# Patient Record
Sex: FEMALE | Race: WHITE | NOT HISPANIC OR LATINO | URBAN - METROPOLITAN AREA
[De-identification: names, ages, dates, MRNs, and addresses within clinical notes are randomized per-mention and may not be internally consistent; named-entity substitution may affect disease eponyms.]

---
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
## 2021-06-13 NOTE — ED ADULT NURSE NOTE - NSSUSCREENINGQ2_ED_ALL_ED
No [FreeTextEntry1] : Blood tests and urine sent to the lab \par \par \par \par Repeat breast US as per scheduled appointment in July

## 2023-08-31 NOTE — ED ADULT NURSE NOTE - SKIN CAPILLARY REFILL
Curettage Text: The wound bed was treated with curettage after the biopsy was performed. 2 seconds or less